# Patient Record
Sex: FEMALE | Race: BLACK OR AFRICAN AMERICAN | NOT HISPANIC OR LATINO | Employment: FULL TIME | URBAN - METROPOLITAN AREA
[De-identification: names, ages, dates, MRNs, and addresses within clinical notes are randomized per-mention and may not be internally consistent; named-entity substitution may affect disease eponyms.]

---

## 2018-03-19 ENCOUNTER — HOSPITAL ENCOUNTER (EMERGENCY)
Facility: OTHER | Age: 28
Discharge: HOME | End: 2018-03-19
Attending: INTERNAL MEDICINE

## 2018-03-19 VITALS
SYSTOLIC BLOOD PRESSURE: 106 MMHG | OXYGEN SATURATION: 100 % | BODY MASS INDEX: 24.41 KG/M2 | DIASTOLIC BLOOD PRESSURE: 62 MMHG | RESPIRATION RATE: 16 BRPM | HEART RATE: 81 BPM | HEIGHT: 64 IN | WEIGHT: 143 LBS | TEMPERATURE: 98 F

## 2018-03-19 DIAGNOSIS — L03.012 CELLULITIS OF FINGER OF LEFT HAND: Primary | ICD-10-CM

## 2018-03-19 LAB
B-HCG UR QL: NEGATIVE
CTP QC/QA: YES

## 2018-03-19 PROCEDURE — 81025 URINE PREGNANCY TEST: CPT | Performed by: INTERNAL MEDICINE

## 2018-03-19 PROCEDURE — 25000003 PHARM REV CODE 250: Performed by: INTERNAL MEDICINE

## 2018-03-19 PROCEDURE — 99283 EMERGENCY DEPT VISIT LOW MDM: CPT

## 2018-03-19 RX ORDER — IBUPROFEN 800 MG/1
800 TABLET ORAL EVERY 8 HOURS PRN
Qty: 30 TABLET | Refills: 0 | Status: SHIPPED | OUTPATIENT
Start: 2018-03-19

## 2018-03-19 RX ORDER — SULFAMETHOXAZOLE AND TRIMETHOPRIM 800; 160 MG/1; MG/1
1 TABLET ORAL 2 TIMES DAILY
Qty: 20 TABLET | Refills: 0 | Status: SHIPPED | OUTPATIENT
Start: 2018-03-19 | End: 2018-03-29

## 2018-03-19 RX ORDER — SULFAMETHOXAZOLE AND TRIMETHOPRIM 800; 160 MG/1; MG/1
1 TABLET ORAL
Status: COMPLETED | OUTPATIENT
Start: 2018-03-19 | End: 2018-03-19

## 2018-03-19 RX ORDER — IBUPROFEN 400 MG/1
800 TABLET ORAL
Status: COMPLETED | OUTPATIENT
Start: 2018-03-19 | End: 2018-03-19

## 2018-03-19 RX ADMIN — IBUPROFEN 800 MG: 400 TABLET, FILM COATED ORAL at 09:03

## 2018-03-19 RX ADMIN — SULFAMETHOXAZOLE AND TRIMETHOPRIM 1 TABLET: 800; 160 TABLET ORAL at 09:03

## 2018-03-20 NOTE — ED PROVIDER NOTES
Encounter Date: 3/19/2018       History     Chief Complaint   Patient presents with    Insect Bite     pt presents with c/o swelling to L, hand following spider bite x2 days; redness noted to site with pain; denies fever, chills     27-year-old female presents to the emergency department complaining of swelling to the left hand times one day.  She states she is having an insect bite, scratch to her left hand, took a nap and then awoke with her left hand swollen and painful.  Denies fever/chills, nausea/vomiting.      The history is provided by the patient. No  was used.   Rash    This is a new problem. The current episode started today. The problem has been unchanged. The problem is associated with an insect bite/sting. The rash is present on the left hand. The pain is at a severity of 5/10. The pain has been constant since onset. Pertinent negatives include no blisters.     Review of patient's allergies indicates:  No Known Allergies  No past medical history on file.  No past surgical history on file.  Family History   Problem Relation Age of Onset    Breast cancer Neg Hx     Colon cancer Neg Hx     Ovarian cancer Neg Hx      Social History   Substance Use Topics    Smoking status: Never Smoker    Smokeless tobacco: Not on file    Alcohol use No     Review of Systems   Constitutional: Negative.  Negative for fever.   Skin: Positive for color change.   All other systems reviewed and are negative.      Physical Exam     Initial Vitals [03/19/18 2016]   BP Pulse Resp Temp SpO2   123/78 81 16 98 °F (36.7 °C) 100 %      MAP       93         Physical Exam    Nursing note and vitals reviewed.  Constitutional: She appears well-developed and well-nourished. No distress.   HENT:   Head: Normocephalic.   Eyes: EOM are normal.   Neck: Normal range of motion.   Cardiovascular: Normal rate and regular rhythm.   Pulmonary/Chest: Breath sounds normal. No respiratory distress.   Abdominal: Soft.    Neurological: She is alert. She has normal strength.   Skin: Skin is warm and dry.   Left dorsal hand with erythema, a 1 cm papular lesion, induration, slight tenderness to palpation and no fluctuance.   Psychiatric: She has a normal mood and affect. Thought content normal.         ED Course   Procedures  Labs Reviewed   POCT URINE PREGNANCY             Medical Decision Making:   Initial Assessment:   27-year-old female presents to the emergency department complaining of swelling to the left hand times one day.  She states she is having an insect bite, scratch to her left hand, took a nap and then awoke with her left hand swollen and painful.    ED Management:  Patient was given instructions for cellulitis and a prescription is for Bactrim and ibuprofen.  First doses were given in the emergency department and patient was advised to follow-up with her primary care physician within the next 2 days for reevaluation.                      Clinical Impression:   The encounter diagnosis was Cellulitis of finger of left hand.    Disposition:   Disposition: Discharged  Condition: Stable                        Dony Khan MD  03/19/18 [id number]